# Patient Record
(demographics unavailable — no encounter records)

---

## 2024-12-30 NOTE — PHYSICAL EXAM
[Normal] : no acute distress, well nourished, well developed and well-appearing [de-identified] : no tenderness or deformity palpable in R elbow.  FROM. no edema. no crepitus.

## 2024-12-30 NOTE — HISTORY OF PRESENT ILLNESS
[FreeTextEntry1] : HIV Follow up Pt states he feels pins and needles on right arm on and off  PHQ-2(0PT) [de-identified] : here for HIV f/u. no probs with meds. no missed doses. taking cholesterol med as well. R arm pins and needles, mostly 2nd and 3rd fingers radiating up to elbow, not above.  intermittent. lasts 3-10 seconds, then resolves. happening around 10x/day for last week.  seems positional, especially with lying on stomach arms stretched out straight behind him. fell out of bed a few days before onset, wondering if related. bunion still present. hasn't seen podiatry yet but has appt in January. imiquimod didn't really help.

## 2025-06-30 NOTE — HISTORY OF PRESENT ILLNESS
[FreeTextEntry1] : hiv f/u rx refill [de-identified] : occ aches and pains, nothing specific.  Descovy BG going well. no concerns. 2 missed doses in last 6 months, incl rosuvastatin. warts resolved with imiquimod. no sex at all